# Patient Record
Sex: FEMALE | Race: WHITE
[De-identification: names, ages, dates, MRNs, and addresses within clinical notes are randomized per-mention and may not be internally consistent; named-entity substitution may affect disease eponyms.]

---

## 2017-09-20 ENCOUNTER — RX ONLY (OUTPATIENT)
Age: 52
Setting detail: RX ONLY
End: 2017-09-20

## 2018-05-03 ENCOUNTER — APPOINTMENT (RX ONLY)
Dept: URBAN - METROPOLITAN AREA CLINIC 20 | Facility: CLINIC | Age: 53
Setting detail: DERMATOLOGY
End: 2018-05-03

## 2018-05-03 DIAGNOSIS — L81.4 OTHER MELANIN HYPERPIGMENTATION: ICD-10-CM

## 2018-05-03 DIAGNOSIS — D18.0 HEMANGIOMA: ICD-10-CM

## 2018-05-03 PROBLEM — D48.5 NEOPLASM OF UNCERTAIN BEHAVIOR OF SKIN: Status: ACTIVE | Noted: 2018-05-03

## 2018-05-03 PROCEDURE — 11100: CPT

## 2018-05-03 PROCEDURE — ? BIOPSY BY SHAVE METHOD

## 2018-05-03 PROCEDURE — ? ADDITIONAL NOTES

## 2018-05-03 PROCEDURE — 99212 OFFICE O/P EST SF 10 MIN: CPT | Mod: 25

## 2018-05-03 ASSESSMENT — LOCATION ZONE DERM: LOCATION ZONE: FACE

## 2018-05-03 ASSESSMENT — LOCATION SIMPLE DESCRIPTION DERM: LOCATION SIMPLE: LEFT FOREHEAD

## 2018-05-03 ASSESSMENT — LOCATION DETAILED DESCRIPTION DERM: LOCATION DETAILED: LEFT SUPERIOR FOREHEAD

## 2018-05-03 NOTE — PROCEDURE: BIOPSY BY SHAVE METHOD
Lab Facility: 
Biopsy Type: H and E
Silver Nitrate Text: The wound bed was treated with silver nitrate after the biopsy was performed.
Was A Bandage Applied: Yes
Bill [id number] For Specimen Handling/Conveyance To Laboratory?: no
Wound Care: Vaseline
Anesthesia Volume In Cc: 0.5
Electrodesiccation And Curettage Text: The wound bed was treated with electrodesiccation and curettage after the biopsy was performed.
Anesthesia Type: 1% lidocaine with 1:100,000 epinephrine and 408mcg clindamycin/ml and a 1:10 solution of 8.4% sodium bicarbonate
Hemostasis: Drysol and Electrocautery
X Size Of Lesion In Cm: 0
Lab: [id number]
Biopsy Method: Personna blade
Detail Level: Detailed
Post-Care Instructions: I reviewed with the patient in detail post-care instructions. Patient is to keep the biopsy site dry overnight, and then apply bacitracin twice daily until healed. Patient may apply hydrogen peroxide soaks to remove any crusting.
Electrodesiccation Text: The wound bed was treated with electrodesiccation after the biopsy was performed.
Notification Instructions: Patient will be notified of biopsy results. However, patient instructed to call the office if not contacted within 2 weeks.
Dressing: Band-Aid
Type Of Destruction Used: Curettage
Consent: Written consent was obtained and risks were reviewed including but not limited to scarring, infection, bleeding, scabbing, incomplete removal, nerve damage and allergy to anesthesia.
Billing Type: Third-Party Bill

## 2018-05-18 ENCOUNTER — APPOINTMENT (RX ONLY)
Dept: URBAN - METROPOLITAN AREA CLINIC 36 | Facility: CLINIC | Age: 53
Setting detail: DERMATOLOGY
End: 2018-05-18

## 2018-05-18 DIAGNOSIS — Z41.9 ENCOUNTER FOR PROCEDURE FOR PURPOSES OTHER THAN REMEDYING HEALTH STATE, UNSPECIFIED: ICD-10-CM

## 2018-05-18 PROCEDURE — ? SCITON BBL

## 2018-05-18 ASSESSMENT — LOCATION DETAILED DESCRIPTION DERM: LOCATION DETAILED: RIGHT MEDIAL FOREHEAD

## 2018-05-18 ASSESSMENT — LOCATION SIMPLE DESCRIPTION DERM: LOCATION SIMPLE: RIGHT FOREHEAD

## 2018-05-18 ASSESSMENT — LOCATION ZONE DERM: LOCATION ZONE: FACE

## 2018-05-18 NOTE — PROCEDURE: SCITON BBL
Detail Level: Zone
Fluence (J/Cm2): 12
Total Pulses (Optional): 3
Spot Size: Finesse Adapter Size: 15 x 45 mm (No Finesse Adapter)
Fluence (J/Cm2): 20
Spot Size: Finesse Adapter Size: 7 mm round
Cooling (In C): 15
Passes: 1
Cooling (In C): 25
Cooling ?: Yes
Additional Comments (Optional): 15x15 square nose and forehead
Anesthesia Volume In Cc: 0
Pulse Duration: 14
Pulse Duration Units: milliseconds
Topical Anesthesia?: 23% lidocaine, 7% tetracaine
Post Procedure Text: The patient tolerated the procedure well. Post care was reviewed with the patient.
Price (Use Numbers Only, No Special Characters Or $): 350.00
Post-Care Instructions: I reviewed with the patient in detail post-care instructions. Patient should stay away from the sun and wear sun protection until treated areas are fully healed.
Repetition Rate (Hz): 10
Spot Size: Finesse Adapter Size: 15 x 15 mm square
Length Of Topical Anesthesia Application (Optional): 60 minutes
Consent: Written consent obtained, risks reviewed including but not limited to crusting, scabbing, blistering, scarring, darker or lighter pigmentary change, bruising, and/or incomplete response.
Indication Override (Will Not Show Above If Text Entered): vascular
Preprocedure Text: The treatment areas were thoroughly cleaned. Clear ultrasound gel was applied to the treatment area. The area was treated with no immediate stacking of pulses.

## 2018-06-29 ENCOUNTER — APPOINTMENT (RX ONLY)
Dept: URBAN - METROPOLITAN AREA CLINIC 36 | Facility: CLINIC | Age: 53
Setting detail: DERMATOLOGY
End: 2018-06-29

## 2018-07-20 ENCOUNTER — APPOINTMENT (RX ONLY)
Dept: URBAN - METROPOLITAN AREA CLINIC 20 | Facility: CLINIC | Age: 53
Setting detail: DERMATOLOGY
End: 2018-07-20

## 2018-11-09 ENCOUNTER — APPOINTMENT (RX ONLY)
Dept: URBAN - METROPOLITAN AREA CLINIC 4 | Facility: CLINIC | Age: 53
Setting detail: DERMATOLOGY
End: 2018-11-09

## 2018-11-09 DIAGNOSIS — Z41.9 ENCOUNTER FOR PROCEDURE FOR PURPOSES OTHER THAN REMEDYING HEALTH STATE, UNSPECIFIED: ICD-10-CM

## 2018-11-09 PROCEDURE — ? SCITON BBL

## 2018-11-09 ASSESSMENT — LOCATION ZONE DERM: LOCATION ZONE: FACE

## 2018-11-09 ASSESSMENT — LOCATION DETAILED DESCRIPTION DERM: LOCATION DETAILED: RIGHT MEDIAL FOREHEAD

## 2018-11-09 ASSESSMENT — LOCATION SIMPLE DESCRIPTION DERM: LOCATION SIMPLE: RIGHT FOREHEAD

## 2018-11-09 NOTE — PROCEDURE: SCITON BBL
Passes: 1
Fluence (J/Cm2): 12
Length Of Topical Anesthesia Application (Optional): 60 minutes
Spot Size: Finesse Adapter Size: 7 mm round
Post Procedure Text: The patient tolerated the procedure well. Post care was reviewed with the patient.
Pulse Duration Units: milliseconds
Spot Size: Finesse Adapter Size: 15 x 45 mm (No Finesse Adapter)
Cooling ?: Yes
Preprocedure Text: The treatment areas were thoroughly cleaned. Clear ultrasound gel was applied to the treatment area. The area was treated with no immediate stacking of pulses.
Total Pulses (Optional): 3
Cooling (In C): 20
Repetition Rate (Hz): 10
Post-Care Instructions: I reviewed with the patient in detail post-care instructions. Patient should stay away from the sun and wear sun protection until treated areas are fully healed.
Spot Size: Finesse Adapter Size: 15 x 15 mm square
Additional Comments (Optional): 15x15 square nose and forehead
Cooling (In C): 15
Detail Level: Zone
Cooling (In C): 25
Treatment Number: 2
Anesthesia Volume In Cc: 0
Indication Override (Will Not Show Above If Text Entered): vascular
Consent: Written consent obtained, risks reviewed including but not limited to crusting, scabbing, blistering, scarring, darker or lighter pigmentary change, bruising, and/or incomplete response.
Topical Anesthesia?: 23% lidocaine, 7% tetracaine
Price (Use Numbers Only, No Special Characters Or $): 350.00

## 2018-12-07 ENCOUNTER — APPOINTMENT (RX ONLY)
Dept: URBAN - METROPOLITAN AREA CLINIC 36 | Facility: CLINIC | Age: 53
Setting detail: DERMATOLOGY
End: 2018-12-07

## 2018-12-07 DIAGNOSIS — Z41.9 ENCOUNTER FOR PROCEDURE FOR PURPOSES OTHER THAN REMEDYING HEALTH STATE, UNSPECIFIED: ICD-10-CM

## 2018-12-07 PROCEDURE — ? SCITON BBL

## 2018-12-07 PROCEDURE — ? ADDITIONAL NOTES

## 2018-12-07 ASSESSMENT — LOCATION ZONE DERM: LOCATION ZONE: FACE

## 2018-12-07 ASSESSMENT — LOCATION DETAILED DESCRIPTION DERM: LOCATION DETAILED: RIGHT MEDIAL FOREHEAD

## 2018-12-07 ASSESSMENT — LOCATION SIMPLE DESCRIPTION DERM: LOCATION SIMPLE: RIGHT FOREHEAD

## 2018-12-07 NOTE — PROCEDURE: SCITON BBL
Cooling (In C): 15
Fluence (J/Cm2): 12
Repetition Rate (Hz): 10
Post Procedure Text: The patient tolerated the procedure well. Post care was reviewed with the patient.
Cooling ?: Yes
Spot Size: Finesse Adapter Size: 7 mm round
Spot Size: Finesse Adapter Size: 15 x 45 mm (No Finesse Adapter)
Pulse Duration Units: milliseconds
Treatment Number: 2
Preprocedure Text: The treatment areas were thoroughly cleaned. Clear ultrasound gel was applied to the treatment area. The area was treated with no immediate stacking of pulses.
Spot Size: Finesse Adapter Size: 15 x 15 mm square
Cooling (In C): 20
Anesthesia Volume In Cc: 0
Post-Care Instructions: I reviewed with the patient in detail post-care instructions. Patient should stay away from the sun and wear sun protection until treated areas are fully healed.
Price (Use Numbers Only, No Special Characters Or $): 350.00
Passes: 1
Additional Comments (Optional): 15x15 square nose and forehead
Length Of Topical Anesthesia Application (Optional): 60 minutes
Indication Override (Will Not Show Above If Text Entered): vascular
Detail Level: Zone
Consent: Written consent obtained, risks reviewed including but not limited to crusting, scabbing, blistering, scarring, darker or lighter pigmentary change, bruising, and/or incomplete response.
Topical Anesthesia?: 23% lidocaine, 7% tetracaine
Fluence (J/Cm2): 25

## 2018-12-07 NOTE — PROCEDURE: ADDITIONAL NOTES
Additional Notes: Patient is going to return in 6 weeks for a Clear and Brilliant Treatment. Following that she will do the maintenance BBL program.
Detail Level: Simple

## 2019-01-18 ENCOUNTER — APPOINTMENT (RX ONLY)
Dept: URBAN - METROPOLITAN AREA CLINIC 36 | Facility: CLINIC | Age: 54
Setting detail: DERMATOLOGY
End: 2019-01-18

## 2020-05-22 ENCOUNTER — OFFICE VISIT (OUTPATIENT)
Dept: URGENT CARE | Facility: PHYSICIAN GROUP | Age: 55
End: 2020-05-22
Payer: COMMERCIAL

## 2020-05-22 VITALS
DIASTOLIC BLOOD PRESSURE: 84 MMHG | HEART RATE: 82 BPM | RESPIRATION RATE: 20 BRPM | SYSTOLIC BLOOD PRESSURE: 126 MMHG | OXYGEN SATURATION: 96 % | TEMPERATURE: 97.8 F

## 2020-05-22 DIAGNOSIS — A60.04 HERPES SIMPLEX VULVOVAGINITIS: ICD-10-CM

## 2020-05-22 DIAGNOSIS — B37.0 ORAL THRUSH: ICD-10-CM

## 2020-05-22 PROCEDURE — 99202 OFFICE O/P NEW SF 15 MIN: CPT | Performed by: FAMILY MEDICINE

## 2020-05-22 RX ORDER — VENLAFAXINE HYDROCHLORIDE 225 MG/1
225 TABLET, EXTENDED RELEASE ORAL DAILY
COMMUNITY

## 2020-05-22 RX ORDER — VALACYCLOVIR HYDROCHLORIDE 1 G/1
1000 TABLET, FILM COATED ORAL DAILY
Qty: 30 TAB | Refills: 1 | Status: SHIPPED | OUTPATIENT
Start: 2020-05-22 | End: 2023-10-16 | Stop reason: CLARIF

## 2020-05-22 RX ORDER — VALACYCLOVIR HYDROCHLORIDE 500 MG/1
500 TABLET, FILM COATED ORAL 2 TIMES DAILY
COMMUNITY

## 2020-05-22 ASSESSMENT — ENCOUNTER SYMPTOMS
DIZZINESS: 0
NAUSEA: 0
MYALGIAS: 0
SORE THROAT: 0
COUGH: 0
CHILLS: 0
SHORTNESS OF BREATH: 0
FEVER: 0
VOMITING: 0

## 2020-05-22 NOTE — PATIENT INSTRUCTIONS
Genital Herpes  Genital herpes is a common sexually transmitted infection (STI) that is caused by a virus. The virus is spread from person to person through sexual contact. Infection can cause itching, blisters, and sores in the genital area or rectal area. This is called an outbreak. It affects both men and women.  Genital herpes is particularly concerning for pregnant women because the virus can be passed to the baby during delivery and cause serious problems. Genital herpes is also a concern for people with a weakened defense (immune) system.  Symptoms of genital herpes may last several days and then go away. However, the virus remains in your body, so you may have more outbreaks of symptoms in the future. The time between outbreaks varies and can be months or years.  CAUSES  Genital herpes is caused by a virus called herpes simplex virus (HSV) type 2 or HSV type 1. These viruses are contagious and are most often spread through sexual contact with an infected person. Sexual contact includes vaginal, anal, and oral sex.  RISK FACTORS  Risk factors for genital herpes include:  · Being sexually active with multiple partners.  · Having unprotected sex.  SIGNS AND SYMPTOMS  Symptoms may include:  · Pain and itching in the genital area or rectal area.  · Small red bumps that turn into blisters and then turn into sores.  · Flu-like symptoms, including:  ¨ Fever.  ¨ Body aches.  · Painful urination.  · Vaginal discharge.  DIAGNOSIS  Genital herpes may be diagnosed by:  · Physical exam.  · Blood test.  · Fluid culture test from an open sore.  TREATMENT  There is no cure for genital herpes. Oral antiviral medicines may be used to speed up healing and to help prevent the return of symptoms. These medicines can also help to reduce the spread of the virus to sexual partners.  HOME CARE INSTRUCTIONS  · Keep the affected areas dry and clean.  · Take medicines only as directed by your health care provider.  · Do not have sexual  contact during active infections. Genital herpes is contagious.  · Practice safe sex. Latex condoms and female condoms may help to prevent the spread of the herpes virus.  · Avoid rubbing or touching the blisters and sores. If you do touch the blister or sores:  ¨ Wash your hands thoroughly.  ¨ Do not touch your eyes afterward.  · If you become pregnant, tell your health care provider if you have had genital herpes.  · Keep all follow-up visits as directed by your health care provider. This is important.  PREVENTION  · Use condoms. Although anyone can contract genital herpes during sexual contact even with the use of a condom, a condom can provide some protection.  · Avoid having multiple sexual partners.  · Talk to your sexual partner about any symptoms and past history that either of you may have.  · Get tested before you have sex. Ask your partner to do the same.  · Recognize the symptoms of genital herpes. Do not have sexual contact if you notice these symptoms.  SEEK MEDICAL CARE IF:  · Your symptoms are not improving with medicine.  · Your symptoms return.  · You have new symptoms.  · You have a fever.  · You have abdominal pain.  · You have redness, swelling, or pain in your eye.  MAKE SURE YOU:  · Understand these instructions.  · Will watch your condition.  · Will get help right away if you are not doing well or get worse.  This information is not intended to replace advice given to you by your health care provider. Make sure you discuss any questions you have with your health care provider.  Document Released: 12/15/2001 Document Revised: 01/08/2016 Document Reviewed: 05/05/2015  [employer] Patient Education © 2017 [employer]  Oral Thrush, Adult  Oral thrush is an infection in your mouth and throat. It causes white patches on your tongue and in your mouth.  Follow these instructions at home:  Helping with soreness  · To lessen your pain:  ¨ Drink cold liquids, like water and iced tea.  ¨ Eat  frozen ice pops or frozen juices.  ¨ Eat foods that are easy to swallow, like gelatin and ice cream.  ¨ Drink from a straw if the patches in your mouth are painful.  General instructions  · Take or use over-the-counter and prescription medicines only as told by your doctor. Medicine for oral thrush may be something to swallow, or it may be something to put on the infected area.  · Eat plain yogurt that has live cultures in it. Read the label to make sure.  · If you wear dentures:  ¨ Take out your dentures before you go to bed.  ¨ Brush them well.  ¨ Soak them in a denture .  · Rinse your mouth with warm salt-water many times a day. To make the salt-water mixture, completely dissolve 1/2-1 teaspoon of salt in 1 cup of warm water.  Contact a doctor if:  · Your problems are getting worse.  · Your problems do not get better in less than 7 days with treatment.  · Your infection is spreading. This may show as white patches on the skin outside of your mouth.  · You are nursing your baby and you have redness and pain in the nipples.  This information is not intended to replace advice given to you by your health care provider. Make sure you discuss any questions you have with your health care provider.  Document Released: 03/14/2011 Document Revised: 09/11/2017 Document Reviewed: 09/11/2017  [employer] Interactive Patient Education © 2017 [employer]

## 2020-05-22 NOTE — PROGRESS NOTES
Subjective:   Kim Alexandre is a 55 y.o. female who presents for Oral Pain (poss thrush for a couple weeks)        Oral Pain   This is a new problem. The current episode started in the past 7 days. The problem occurs constantly. The problem has been unchanged. Associated symptoms include a rash. Pertinent negatives include no chills, coughing, fever, myalgias, nausea, sore throat or vomiting. Associated symptoms comments: Similar symptoms in the past with oral thrush which has been previously precipitated by psychosocial stressors. Treatments tried: Oral antifungals suspension. The treatment provided moderate relief.   Rash   This is a recurrent (Complains of onset of burning and tingling similar to previous episodes of herpes genitalis) problem. The current episode started in the past 7 days. Location: Genitourinary. Rash characteristics: Burning and tingling. Pertinent negatives include no cough, fever, shortness of breath, sore throat or vomiting. Treatments tried: Valtrex 1000 mg a day for 5 days as previously prescribed by the primary care provider. The treatment provided significant relief.     PMH:  has no past medical history on file.  MEDS:   Current Outpatient Medications:   •  venlafaxine ER (EFFEXOR) 225 MG TABLET SR 24 HR tablet, Take 225 mg by mouth every day., Disp: , Rfl:   •  valACYclovir (VALTREX) 500 MG Tab, Take 500 mg by mouth 2 times a day., Disp: , Rfl:   •  valacyclovir (VALTREX) 1 GM Tab, Take 1 Tab by mouth every day., Disp: 30 Tab, Rfl: 1  •  nystatin (MYCOSTATIN) 233618 UNIT/ML Suspension, Take 5 mL by mouth 4 times a day for 7 days., Disp: 140 mL, Rfl: 1  ALLERGIES: No Known Allergies  SURGHX: No past surgical history on file.  SOCHX:    FH: Family history was reviewed  Review of Systems   Constitutional: Negative for chills and fever.   HENT: Negative for sore throat.    Respiratory: Negative for cough and shortness of breath.    Gastrointestinal: Negative for nausea and  vomiting.   Genitourinary: Negative for dysuria.   Musculoskeletal: Negative for myalgias.   Skin: Positive for rash.   Neurological: Negative for dizziness.        Objective:   /84 (BP Location: Left arm, Patient Position: Sitting, BP Cuff Size: Adult)   Pulse 82   Temp 36.6 °C (97.8 °F) (Temporal)   Resp 20   SpO2 96%   Physical Exam  Vitals signs and nursing note reviewed.   Constitutional:       General: She is not in acute distress.     Appearance: She is well-developed.   HENT:      Head: Normocephalic and atraumatic.      Right Ear: External ear normal.      Left Ear: External ear normal.      Nose: Nose normal.      Mouth/Throat:      Mouth: Mucous membranes are moist.      Tongue: Lesions (White peristomal superficial exudate with peripheral erythema consistent with oral candidiasis) present.      Pharynx: Oropharynx is clear.   Eyes:      Conjunctiva/sclera: Conjunctivae normal.   Cardiovascular:      Rate and Rhythm: Normal rate.   Pulmonary:      Effort: Pulmonary effort is normal. No respiratory distress.      Breath sounds: Normal breath sounds.   Abdominal:      General: There is no distension.   Genitourinary:     Comments: deferred  Musculoskeletal: Normal range of motion.   Skin:     General: Skin is warm and dry.   Neurological:      General: No focal deficit present.      Mental Status: She is alert and oriented to person, place, and time. Mental status is at baseline.      Gait: Gait (gait at baseline) normal.   Psychiatric:         Judgment: Judgment normal.           Assessment/Plan:   1. Herpes simplex vulvovaginitis  - valacyclovir (VALTREX) 1 GM Tab; Take 1 Tab by mouth every day.  Dispense: 30 Tab; Refill: 1    2. Oral thrush  - nystatin (MYCOSTATIN) 963826 UNIT/ML Suspension; Take 5 mL by mouth 4 times a day for 7 days.  Dispense: 140 mL; Refill: 1    Other orders  - venlafaxine ER (EFFEXOR) 225 MG TABLET SR 24 HR tablet; Take 225 mg by mouth every day.  - valACYclovir (VALTREX)  500 MG Tab; Take 500 mg by mouth 2 times a day.      Discussed close monitoring, return precautions, and supportive measures including maintaining adequate fluid hydration and caloric intake, relative rest and OTC symptom management including acetaminophen as needed for pain and/or fever.    Differential diagnosis, natural history, supportive care, and indications for immediate follow-up discussed.     Advised the patient to follow-up with the primary care physician for recheck, reevaluation, and consideration of further management.    Please note that this dictation was created using voice recognition software. I have worked with consultants from the vendor as well as technical experts from [employer] to optimize the interface. I have made every reasonable attempt to correct obvious errors, but I expect that there are errors of grammar and possibly content that I did not discover before finalizing the note.

## 2021-11-29 ENCOUNTER — APPOINTMENT (RX ONLY)
Dept: URBAN - METROPOLITAN AREA CLINIC 20 | Facility: CLINIC | Age: 56
Setting detail: DERMATOLOGY
End: 2021-11-29

## 2021-11-29 DIAGNOSIS — Z41.9 ENCOUNTER FOR PROCEDURE FOR PURPOSES OTHER THAN REMEDYING HEALTH STATE, UNSPECIFIED: ICD-10-CM

## 2021-11-29 PROCEDURE — ? SCITON BBL

## 2021-11-29 ASSESSMENT — LOCATION SIMPLE DESCRIPTION DERM
LOCATION SIMPLE: RIGHT CHEEK
LOCATION SIMPLE: LEFT FOREHEAD
LOCATION SIMPLE: LEFT LIP

## 2021-11-29 ASSESSMENT — LOCATION ZONE DERM
LOCATION ZONE: LIP
LOCATION ZONE: FACE

## 2021-11-29 ASSESSMENT — LOCATION DETAILED DESCRIPTION DERM
LOCATION DETAILED: LEFT INFERIOR MEDIAL FOREHEAD
LOCATION DETAILED: LEFT LOWER CUTANEOUS LIP
LOCATION DETAILED: RIGHT INFERIOR CENTRAL MALAR CHEEK

## 2021-11-29 NOTE — PROCEDURE: SCITON BBL
Preprocedure Text: The treatment areas were thoroughly cleaned. Clear ultrasound gel was applied to the treatment area. The area was treated with no immediate stacking of pulses.
Cooling (In C): 20
Cooling (In C): 15
Fluence (J/Cm2): 25
Post Procedure Text: The patient tolerated the procedure well. Post care was reviewed with the patient.
Repetition Rate (Hz): 1
Fluence (J/Cm2): 13
Fluence (J/Cm2): 10
Detail Level: Zone
Pulse Duration Units: milliseconds
Spot Size: Finesse Adapter Size: 15 x 15 mm square
Spot Size: Finesse Adapter Size: 11 mm square
Consent: Written consent obtained, risks reviewed including but not limited to crusting, scabbing, blistering, scarring, darker or lighter pigmentary change, bruising, and/or incomplete response.
Post-Care Instructions: I reviewed with the patient in detail post-care instructions. Patient should stay away from the sun and wear sun protection until treated areas are fully healed.
Price (Use Numbers Only, No Special Characters Or $): 450.00
Hide Repetition Rate?: No
Cooling ?: Yes
Spot Size: Finesse Adapter Size: 7 mm round
Pulse Duration: 30
Anesthesia Volume In Cc: 0
Cooling (In C): 22

## 2022-03-07 ENCOUNTER — APPOINTMENT (RX ONLY)
Dept: URBAN - METROPOLITAN AREA CLINIC 20 | Facility: CLINIC | Age: 57
Setting detail: DERMATOLOGY
End: 2022-03-07

## 2022-03-07 DIAGNOSIS — Z41.9 ENCOUNTER FOR PROCEDURE FOR PURPOSES OTHER THAN REMEDYING HEALTH STATE, UNSPECIFIED: ICD-10-CM

## 2022-03-07 PROCEDURE — ? Q-SWITCHED LASER

## 2022-03-07 PROCEDURE — ? SCITON BBL

## 2022-03-07 ASSESSMENT — LOCATION SIMPLE DESCRIPTION DERM
LOCATION SIMPLE: LEFT CHEEK
LOCATION SIMPLE: RIGHT CHEEK
LOCATION SIMPLE: LEFT EYEBROW
LOCATION SIMPLE: RIGHT EYEBROW

## 2022-03-07 ASSESSMENT — LOCATION ZONE DERM: LOCATION ZONE: FACE

## 2022-03-07 ASSESSMENT — LOCATION DETAILED DESCRIPTION DERM
LOCATION DETAILED: LEFT MEDIAL EYEBROW
LOCATION DETAILED: RIGHT MEDIAL EYEBROW
LOCATION DETAILED: RIGHT CENTRAL MALAR CHEEK
LOCATION DETAILED: LEFT CENTRAL MALAR CHEEK

## 2022-03-07 NOTE — HPI: OTHER
Condition:: Hyperpigmentation and sun damage.
Please Describe Your Condition:: is being seen for a chief complaint of Hyperpigmentation and sun damage.. .as well as tattoo correction on brows.

## 2022-03-07 NOTE — PROCEDURE: SCITON BBL
Passes: 1
Pulse Duration: 20
Price (Use Numbers Only, No Special Characters Or $): 450.00
Fluence (J/Cm2): 25
Cooling (In C): 22
Spot Size: Finesse Adapter Size: 15 x 15 mm square
Pulse Duration Units: milliseconds
Cooling ?: Yes
Fluence (J/Cm2): 9
Spot Size: Finesse Adapter Size: 11 mm round
Pulse Duration: 15
Treatment Number: 4
Anesthesia Volume In Cc: 0
Preprocedure Text: The treatment areas were thoroughly cleaned. Clear ultrasound gel was applied to the treatment area. The area was treated with no immediate stacking of pulses.
Hide Repetition Rate?: No
Post Procedure Text: The patient tolerated the procedure well. Post care was reviewed with the patient.
Post-Care Instructions: I reviewed with the patient in detail post-care instructions. Patient should stay away from the sun and wear sun protection until treated areas are fully healed.
Detail Level: Zone
Fluence (J/Cm2): 13
Consent: Written consent obtained, risks reviewed including but not limited to crusting, scabbing, blistering, scarring, darker or lighter pigmentary change, bruising, and/or incomplete response.

## 2022-03-07 NOTE — PROCEDURE: Q-SWITCHED LASER
Detail Level: Detailed
Frequency In Hz: 1
Spot Size In Mm: 2
Endpoint: Vaseline applied. Post care reviewed with patient.
External Cooling Fan Speed: 0
Consent: Written consent obtained, risks reviewed including but not limited to crusting, scabbing, blistering, scarring, darker or lighter pigmentary change, systemic reactions, ulceration, incidental hair removal, bruising, and/or incomplete removal.
Fluence: 0.1
Eye Protection: Laser Aid
Post-Care Instructions: I reviewed with the patient in detail post-care instructions. Patient should avoid sunlight and wear sun protection.
Price (Use Numbers Only, No Special Characters Or $): 100.00
Spot Size In Mm: 4

## 2022-04-18 ENCOUNTER — APPOINTMENT (RX ONLY)
Dept: URBAN - METROPOLITAN AREA CLINIC 20 | Facility: CLINIC | Age: 57
Setting detail: DERMATOLOGY
End: 2022-04-18

## 2022-04-18 DIAGNOSIS — Z41.9 ENCOUNTER FOR PROCEDURE FOR PURPOSES OTHER THAN REMEDYING HEALTH STATE, UNSPECIFIED: ICD-10-CM

## 2022-04-18 PROCEDURE — ? FRAXEL

## 2022-04-18 PROCEDURE — ? Q-SWITCHED LASER

## 2022-04-18 ASSESSMENT — LOCATION DETAILED DESCRIPTION DERM
LOCATION DETAILED: RIGHT CENTRAL MALAR CHEEK
LOCATION DETAILED: RIGHT MEDIAL EYEBROW
LOCATION DETAILED: RIGHT MEDIAL FOREHEAD
LOCATION DETAILED: LEFT INFERIOR CENTRAL MALAR CHEEK
LOCATION DETAILED: LEFT CHIN

## 2022-04-18 ASSESSMENT — LOCATION SIMPLE DESCRIPTION DERM
LOCATION SIMPLE: LEFT CHEEK
LOCATION SIMPLE: RIGHT CHEEK
LOCATION SIMPLE: RIGHT FOREHEAD
LOCATION SIMPLE: RIGHT EYEBROW
LOCATION SIMPLE: CHIN

## 2022-04-18 ASSESSMENT — LOCATION ZONE DERM: LOCATION ZONE: FACE

## 2022-04-18 NOTE — PROCEDURE: Q-SWITCHED LASER
Detail Level: Detailed
Frequency In Hz: 1
Spot Size In Mm: 2
Endpoint: Vaseline applied. Post care reviewed with patient.
External Cooling Fan Speed: 0
Consent: Written consent obtained, risks reviewed including but not limited to crusting, scabbing, blistering, scarring, darker or lighter pigmentary change, systemic reactions, ulceration, incidental hair removal, bruising, and/or incomplete removal.
Eye Protection: Laser Aid
Post-Care Instructions: I reviewed with the patient in detail post-care instructions. Patient should avoid sunlight and wear sun protection.
Price (Use Numbers Only, No Special Characters Or $): 100.00
Spot Size In Mm: 3

## 2022-04-18 NOTE — PROCEDURE: FRAXEL
Number Of Passes: 4
Consent: Written consent obtained, risks reviewed including but not limited to pain and incomplete improvement.
Location: full face except eyelids
Total Coverage: 9%
Energy(Mj/Cm2): 20
Energy(Mj/Cm2): 1
Location: neck
Add Post-Care Below To The Note: Yes
Medium Plastic Eye Shield Text: The ocular mucosa was anesthetized with tetracaine. Once adequate anesthesia was optained, medium plastic eye shields were inserted and remained in place until the procedure was completed.
Treatment Level: 2
Large Plastic Eye Shield Text: The ocular mucosa was anesthetized with tetracaine. Once adequate anesthesia was optained, large plastic eye shields were inserted and remained in place until the procedure was completed.
Total Coverage: 45%
Topical Anesthesia Type: 23% lidocaine, 7% tetracaine
Post-Care Instructions: I reviewed with the patient in detail post-care instructions. Patient should avoid sun until area fully healed.
External Cooling: Armin Cryo 5
Was An Eye Shield Used?: No
Total Coverage: 11%
External Cooling Fan Speed: 5
Length Of Topical Anesthesia Application (Optional): 60 minutes
Small Metal Eye Shield Text: The ocular mucosa was anesthetized with tetracaine. Once adequate anesthesia was optained, small metal eye shields were inserted and remained in place until the procedure was completed.
Depth In Microns (Use Numbers Only, No Special Characters Or $): 202
Detail Level: Simple
Price (Use Numbers Only, No Special Characters Or $): 600.00
Total Coverage: 20%
Medium Metal Eye Shield Text: The ocular mucosa was anesthetized with tetracaine. Once adequate anesthesia was optained, medium metal eye shields were inserted and remained in place until the procedure was completed.
Wavelength: 1927nm
Large Metal Eye Shield Text: The ocular mucosa was anesthetized with tetracaine. Once adequate anesthesia was optained, large metal eye shields were inserted and remained in place until the procedure was completed.
Small Plastic Eye Shield Text: The ocular mucosa was anesthetized with tetracaine. Once adequate anesthesia was optained, small plastic eye shields were inserted and remained in place until the procedure was completed.
Indication: resurfacing

## 2022-05-26 ENCOUNTER — APPOINTMENT (RX ONLY)
Dept: URBAN - METROPOLITAN AREA CLINIC 36 | Facility: CLINIC | Age: 57
Setting detail: DERMATOLOGY
End: 2022-05-26

## 2023-10-16 PROBLEM — S83.249A MEDIAL MENISCUS TEAR: Status: ACTIVE | Noted: 2023-10-16
